# Patient Record
Sex: MALE | Race: WHITE | NOT HISPANIC OR LATINO | Employment: OTHER | URBAN - METROPOLITAN AREA
[De-identification: names, ages, dates, MRNs, and addresses within clinical notes are randomized per-mention and may not be internally consistent; named-entity substitution may affect disease eponyms.]

---

## 2018-01-01 ENCOUNTER — TELEPHONE (OUTPATIENT)
Dept: GASTROENTEROLOGY | Facility: CLINIC | Age: 73
End: 2018-01-01

## 2018-10-23 NOTE — TELEPHONE ENCOUNTER
instr next available is 11/27, did not want to schedule. I did instr he should go to ER for eval. Pt has bladder ca has never been diagnosed with gastro paresis. Wife states food just sits in his stomach.

## 2018-10-23 NOTE — TELEPHONE ENCOUNTER
----- Message from Britney Lehman sent at 10/23/2018 10:13 AM CDT -----  Contact: spouse [phone]  Patient requesting to be seen today or tomorrow for gastroparesis. Callback number [phone]

## 2018-11-07 PROBLEM — K56.609 INTESTINAL OBSTRUCTION: Status: ACTIVE | Noted: 2018-01-01

## 2018-11-09 PROBLEM — E44.1 MALNUTRITION OF MILD DEGREE: Status: ACTIVE | Noted: 2018-01-01

## 2018-11-15 PROBLEM — K20.90 ESOPHAGITIS: Status: ACTIVE | Noted: 2018-01-01

## 2018-11-15 PROBLEM — C78.6 PERITONEAL CARCINOMATOSIS: Status: ACTIVE | Noted: 2018-01-01

## 2018-11-15 PROBLEM — I95.81 POSTPROCEDURAL HYPOTENSION: Status: ACTIVE | Noted: 2018-01-01

## 2018-11-16 PROBLEM — D64.9 ANEMIA: Status: ACTIVE | Noted: 2018-01-01

## 2018-11-21 PROBLEM — Z75.8 DISCHARGE PLANNING ISSUES: Status: ACTIVE | Noted: 2018-01-01

## 2018-11-22 PROBLEM — Z71.89 ADVANCE CARE PLANNING: Status: ACTIVE | Noted: 2018-01-01
